# Patient Record
Sex: FEMALE | Race: WHITE | NOT HISPANIC OR LATINO | Employment: UNEMPLOYED | URBAN - METROPOLITAN AREA
[De-identification: names, ages, dates, MRNs, and addresses within clinical notes are randomized per-mention and may not be internally consistent; named-entity substitution may affect disease eponyms.]

---

## 2017-01-05 ENCOUNTER — APPOINTMENT (OUTPATIENT)
Dept: ADMISSIONS | Facility: MEDICAL CENTER | Age: 42
End: 2017-01-05
Attending: SPECIALIST
Payer: MEDICAID

## 2017-01-10 PROBLEM — N39.3 FEMALE STRESS INCONTINENCE: Status: ACTIVE | Noted: 2017-01-10

## 2017-05-13 ENCOUNTER — APPOINTMENT (OUTPATIENT)
Dept: RADIOLOGY | Facility: MEDICAL CENTER | Age: 42
End: 2017-05-13
Attending: EMERGENCY MEDICINE
Payer: MEDICAID

## 2017-05-13 ENCOUNTER — HOSPITAL ENCOUNTER (EMERGENCY)
Facility: MEDICAL CENTER | Age: 42
End: 2017-05-13
Attending: EMERGENCY MEDICINE
Payer: MEDICAID

## 2017-05-13 VITALS
HEIGHT: 64 IN | RESPIRATION RATE: 16 BRPM | DIASTOLIC BLOOD PRESSURE: 76 MMHG | SYSTOLIC BLOOD PRESSURE: 109 MMHG | BODY MASS INDEX: 32.44 KG/M2 | HEART RATE: 99 BPM | WEIGHT: 190 LBS

## 2017-05-13 DIAGNOSIS — S09.90XA CLOSED HEAD INJURY, INITIAL ENCOUNTER: ICD-10-CM

## 2017-05-13 DIAGNOSIS — S00.83XA FACIAL CONTUSION, INITIAL ENCOUNTER: ICD-10-CM

## 2017-05-13 DIAGNOSIS — R45.2 UNHAPPINESS: ICD-10-CM

## 2017-05-13 PROCEDURE — 96374 THER/PROPH/DIAG INJ IV PUSH: CPT

## 2017-05-13 PROCEDURE — 700111 HCHG RX REV CODE 636 W/ 250 OVERRIDE (IP): Performed by: EMERGENCY MEDICINE

## 2017-05-13 PROCEDURE — 71010 DX-CHEST-PORTABLE (1 VIEW): CPT

## 2017-05-13 PROCEDURE — 700102 HCHG RX REV CODE 250 W/ 637 OVERRIDE(OP): Performed by: EMERGENCY MEDICINE

## 2017-05-13 PROCEDURE — 99284 EMERGENCY DEPT VISIT MOD MDM: CPT

## 2017-05-13 PROCEDURE — 70486 CT MAXILLOFACIAL W/O DYE: CPT

## 2017-05-13 PROCEDURE — A9270 NON-COVERED ITEM OR SERVICE: HCPCS | Performed by: EMERGENCY MEDICINE

## 2017-05-13 PROCEDURE — 70450 CT HEAD/BRAIN W/O DYE: CPT

## 2017-05-13 RX ORDER — TRAMADOL HYDROCHLORIDE 50 MG/1
50 TABLET ORAL ONCE
Status: COMPLETED | OUTPATIENT
Start: 2017-05-13 | End: 2017-05-13

## 2017-05-13 RX ORDER — ONDANSETRON 4 MG/1
4 TABLET, ORALLY DISINTEGRATING ORAL ONCE
Status: COMPLETED | OUTPATIENT
Start: 2017-05-13 | End: 2017-05-13

## 2017-05-13 RX ADMIN — ONDANSETRON 4 MG: 4 TABLET, ORALLY DISINTEGRATING ORAL at 20:22

## 2017-05-13 RX ADMIN — HYDROMORPHONE HYDROCHLORIDE 1 MG: 1 INJECTION, SOLUTION INTRAMUSCULAR; INTRAVENOUS; SUBCUTANEOUS at 18:03

## 2017-05-13 RX ADMIN — TRAMADOL HYDROCHLORIDE 50 MG: 50 TABLET, COATED ORAL at 20:22

## 2017-05-13 ASSESSMENT — LIFESTYLE VARIABLES: DO YOU DRINK ALCOHOL: NO
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
## 2017-05-13 NOTE — ED AVS SNAPSHOT
PolySuite Access Code: [email or web address]  Expires: 6/12/2017  8:18 PM    Your email address is not on file at [facility].  Email Addresses are required for you to sign up for PolySuite, please contact [phone] to verify your personal information and to provide your email address prior to attempting to register for PolySuite.    Saira Wadsworthley  [address] [place] [address]    PolySuite  A secure, online tool to manage your health information     [facility]’s PolySuite® is a secure, online tool that connects you to your personalized health information from the privacy of your home -- day or night - making it very easy for you to manage your healthcare. Once the activation process is completed, you can even access your medical information using the PolySuite luba, which is available for free in the Apple Luba store or Google Play store.     To learn more about PolySuite, visit [email or web address]    There are two levels of access available (as shown below):   My Chart Features  [facility] Primary Care Doctor [facility]  Specialists [facility]  Urgent  Care Non-[facility] Primary Care Doctor   Email your healthcare team securely and privately 24/7 X X X    Manage appointments: schedule your next appointment; view details of past/upcoming appointments X      Request prescription refills. X      View recent personal medical records, including lab and immunizations X X X X   View health record, including health history, allergies, medications X X X X   Read reports about your outpatient visits, procedures, consult and ER notes X X X X   See your discharge summary, which is a recap of your hospital and/or ER visit that includes your diagnosis, lab results, and care plan X X  X     How to register for PolySuite:  Once your e-mail address has been verified, follow the following steps to sign up for PolySuite.     1. Go to  [email or web address]  2. Click on the Sign Up Now box, which takes you to the New Member Sign Up page. You will  need to provide the following information:  a. Enter your [email or web address] Access Code exactly as it appears at the top of this page. (You will not need to use this code after you’ve completed the sign-up process. If you do not sign up before the expiration date, you must request a new code.)   b. Enter your date of birth.   c. Enter your home email address.   d. Click Submit, and follow the next screen’s instructions.  3. Create a Power Plus Communicationst ID. This will be your [email or web address] login ID and cannot be changed, so think of one that is secure and easy to remember.  4. Create a [email or web address] password. You can change your password at any time.  5. Enter your Password Reset Question and Answer. This can be used at a later time if you forget your password.   6. Enter your e-mail address. This allows you to receive e-mail notifications when new information is available in [email or web address].  7. Click Sign Up. You can now view your health information.    For assistance activating your [email or web address] account, call [phone]

## 2017-05-13 NOTE — ED AVS SNAPSHOT
5/13/2017    Saira Groves  [address] [place] [address]    Dear Saira:    Critical access hospital wants to ensure your discharge home is safe and you or your loved ones have had all of your questions answered regarding your care after you leave the hospital.    Below is a list of resources and contact information should you have any questions regarding your hospital stay, follow-up instructions, or active medical symptoms.    Questions or Concerns Regarding… Contact   Medical Questions Related to Your Discharge  (7 days a week, 8am-5pm) Contact a Nurse Care Coordinator   [phone]   Medical Questions Not Related to Your Discharge  (24 hours a day / 7 days a week)  Contact the Nurse Health Line   [phone]    Medications or Discharge Instructions Refer to your discharge packet   or contact your [facility] Primary Care Provider   [phone]   Follow-up Appointment(s) Schedule your appointment via [employer]   or contact Scheduling [phone]   Billing Review your statement via [employer]  or contact Billing [phone]   Medical Records Review your records via [employer]   or contact Medical Records [phone]     You may receive a telephone call within two days of discharge. This call is to make certain you understand your discharge instructions and have the opportunity to have any questions answered. You can also easily access your medical information, test results and upcoming appointments via the [employer] free online health management tool. You can learn more and sign up at [employer]. For assistance setting up your [employer] account, please call [phone].    Once again, we want to ensure your discharge home is safe and that you have a clear understanding of any next steps in your care. If you have any questions or concerns, please do not hesitate to contact us, we are here for you. Thank you for choosing [facility] for your healthcare needs.    Sincerely,    Your [facility] Healthcare Team
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
## 2017-05-14 NOTE — DISCHARGE INSTRUCTIONS
Return immediately to the Emergency Department if you experience continuing or worsening headache, any numbness/weakness/tingling, fever or any other new or worsening symptoms.       Facial or Scalp Contusion  A facial or scalp contusion is a deep bruise on the face or head. Injuries to the face and head generally cause a lot of swelling, especially around the eyes. Contusions are the result of an injury that caused bleeding under the skin. The contusion may turn blue, purple, or yellow. Minor injuries will give you a painless contusion, but more severe contusions may stay painful and swollen for a few weeks.   CAUSES   A facial or scalp contusion is caused by a blunt injury or trauma to the face or head area.   SIGNS AND SYMPTOMS   · Swelling of the injured area.    · Discoloration of the injured area.    · Tenderness, soreness, or pain in the injured area.    DIAGNOSIS   The diagnosis can be made by taking a medical history and doing a physical exam. An X-ray exam, CT scan, or MRI may be needed to determine if there are any associated injuries, such as broken bones (fractures).  TREATMENT   Often, the best treatment for a facial or scalp contusion is applying cold compresses to the injured area. Over-the-counter medicines may also be recommended for pain control.   HOME CARE INSTRUCTIONS   · Only take over-the-counter or prescription medicines as directed by your health care provider.    · Apply ice to the injured area.    · Put ice in a plastic bag.    · Place a towel between your skin and the bag.    · Leave the ice on for 20 minutes, 2-3 times a day.    SEEK MEDICAL CARE IF:  · You have bite problems.    · You have pain with chewing.    · You are concerned about facial defects.  SEEK IMMEDIATE MEDICAL CARE IF:  · You have severe pain or a headache that is not relieved by medicine.    · You have unusual sleepiness, confusion, or personality changes.    · You throw up (vomit).    · You have a persistent  nosebleed.    · You have double vision or blurred vision.    · You have fluid drainage from your nose or ear.    · You have difficulty walking or using your arms or legs.    MAKE SURE YOU:   · Understand these instructions.  · Will watch your condition.  · Will get help right away if you are not doing well or get worse.     This information is not intended to replace advice given to you by your health care provider. Make sure you discuss any questions you have with your health care provider.     Document Released: 01/25/2006 Document Revised: 01/08/2016 Document Reviewed: 07/31/2014  [employer] Interactive Patient Education ©2016 [employer]        Head Injury, Adult  You have a head injury. Headaches and throwing up (vomiting) are common after a head injury. It should be easy to wake up from sleeping. Sometimes you must stay in the hospital. Most problems happen within the first 24 hours. Side effects may occur up to 7-10 days after the injury.   WHAT ARE THE TYPES OF HEAD INJURIES?  Head injuries can be as minor as a bump. Some head injuries can be more severe. More severe head injuries include:  · A jarring injury to the brain (concussion).  · A bruise of the brain (contusion). This mean there is bleeding in the brain that can cause swelling.  · A cracked skull (skull fracture).  · Bleeding in the brain that collects, clots, and forms a bump (hematoma).  WHEN SHOULD I GET HELP RIGHT AWAY?   · You are confused or sleepy.  · You cannot be woken up.  · You feel sick to your stomach (nauseous) or keep throwing up (vomiting).  · Your dizziness or unsteadiness is getting worse.  · You have very bad, lasting headaches that are not helped by medicine. Take medicines only as told by your doctor.  · You cannot use your arms or legs like normal.  · You cannot walk.  · You notice changes in the black spots in the center of the colored part of your eye (pupil).  · You have clear or bloody fluid coming from your nose or  ears.  · You have trouble seeing.  During the next 24 hours after the injury, you must stay with someone who can watch you. This person should get help right away (call 911 in the [place] if you start to shake and are not able to control it (have seizures), you pass out, or you are unable to wake up.  HOW CAN I PREVENT A HEAD INJURY IN THE FUTURE?  · Wear seat belts.  · Wear a helmet while bike riding and playing sports like football.  · Stay away from dangerous activities around the house.  WHEN CAN I RETURN TO NORMAL ACTIVITIES AND ATHLETICS?  See your doctor before doing these activities. You should not do normal activities or play contact sports until 1 week after the following symptoms have stopped:  · Headache that does not go away.  · Dizziness.  · Poor attention.  · Confusion.  · Memory problems.  · Sickness to your stomach or throwing up.  · Tiredness.  · Fussiness.  · Bothered by bright lights or loud noises.  · Anxiousness or depression.  · Restless sleep.  MAKE SURE YOU:   · Understand these instructions.  · Will watch your condition.  · Will get help right away if you are not doing well or get worse.     This information is not intended to replace advice given to you by your health care provider. Make sure you discuss any questions you have with your health care provider.     Document Released: 11/30/2009 Document Revised: 01/08/2016 Document Reviewed: 08/25/2014  [employer] Interactive Patient Education ©2016 [employer]

## 2017-10-25 ENCOUNTER — TELEPHONE (OUTPATIENT)
Dept: CARDIOLOGY | Facility: MEDICAL CENTER | Age: 42
End: 2017-10-25

## 2017-10-30 ENCOUNTER — OFFICE VISIT (OUTPATIENT)
Dept: CARDIOLOGY | Facility: MEDICAL CENTER | Age: 42
End: 2017-10-30
Payer: MEDICAID

## 2017-10-30 VITALS
DIASTOLIC BLOOD PRESSURE: 80 MMHG | HEIGHT: 64 IN | SYSTOLIC BLOOD PRESSURE: 140 MMHG | WEIGHT: 189 LBS | BODY MASS INDEX: 32.27 KG/M2 | HEART RATE: 80 BPM

## 2017-10-30 DIAGNOSIS — R07.89 OTHER CHEST PAIN: ICD-10-CM

## 2017-10-30 DIAGNOSIS — R94.31 NONSPECIFIC ABNORMAL ELECTROCARDIOGRAM (ECG) (EKG): ICD-10-CM

## 2017-10-30 LAB — EKG IMPRESSION: NORMAL

## 2017-10-30 PROCEDURE — 99244 OFF/OP CNSLTJ NEW/EST MOD 40: CPT | Performed by: INTERNAL MEDICINE

## 2017-10-30 PROCEDURE — 93000 ELECTROCARDIOGRAM COMPLETE: CPT | Performed by: INTERNAL MEDICINE

## 2017-10-30 ASSESSMENT — ENCOUNTER SYMPTOMS
COUGH: 0
ABDOMINAL PAIN: 0
PALPITATIONS: 0
SHORTNESS OF BREATH: 0
DIZZINESS: 0
WEIGHT LOSS: 0
HALLUCINATIONS: 0
BRUISES/BLEEDS EASILY: 0
SENSORY CHANGE: 0
ORTHOPNEA: 0
VOMITING: 0
DEPRESSION: 0
PND: 0
FEVER: 0
FALLS: 0
EYE PAIN: 0
CLAUDICATION: 0
CHILLS: 0
EYE DISCHARGE: 0
LOSS OF CONSCIOUSNESS: 0
MYALGIAS: 0
BLOOD IN STOOL: 0
SPEECH CHANGE: 0
DOUBLE VISION: 0
HEADACHES: 0
BLURRED VISION: 0
NAUSEA: 0

## 2017-10-30 NOTE — PROGRESS NOTES
"Subjective:   Patient is 43 yo woman who is here for cardiac care and evaluation for chest pain. Patient has chest pain at sporadic times. No specific precipitating factors or worsening factors. Chest pain is described to be pressure-like sensation however localized at anterior chest without radition. Lasting for seconds to minutes. No prior cardiac problems. No prior cardiac workup.    I have reviewed patient's ECG, which shows normal sinus rhythm, normal DC, QT intervals. No evidence of acute coronary syndrome.    Past Medical History:   Diagnosis Date   • ADD (attention deficit disorder with hyperactivity)    • Anesthesia     \"Woke up with itchy rash, anxious, low Bp & elevated HRx1 episode\"   • Anxiety    • Anxiety and depression    • ASTHMA     Has Albuterol prn   • Cancer (CMS-[facility]) 2016    \"Skin Cancer\"   • ENDOMETRIOSIS    • IBS (irritable bowel syndrome)    • Lactose intolerance    • OCD (obsessive compulsive disorder)    • Osteoarthritis 1-5-17    \"Bilateral Shoulders\"   • Pain 1-5-17    \"Endometrosis & arthritis pain\"   • Urinary incontinence      Past Surgical History:   Procedure Laterality Date   • ANTERIOR AND POSTERIOR REPAIR  1/10/2017    Procedure: ANTERIOR AND POSTERIOR REPAIR;  Surgeon: Bunny Martin M.D.;  Location: SURGERY SAME DAY [facility] OR[facility];  Service:    • ENTEROCELE REPAIR  1/10/2017    Procedure: ENTEROCELE REPAIR;  Surgeon: Bunny Martin M.D.;  Location: SURGERY SAME DAY [facility] OR[facility];  Service:    • VAGINAL SUSPENSION  1/10/2017    Procedure: VAGINAL SUSPENSION - SACROSPINOUS VAULT, PERINEOPLASTY;  Surgeon: Bunny Martin M.D.;  Location: SURGERY SAME DAY [facility];  Service:    • BLADDER SLING FEMALE  1/10/2017    Procedure: BLADDER SLING FEMALE -  FOR : TOT;  Surgeon: Bunny Martin M.D.;  Location: SURGERY SAME DAY [facility] OR[facility];  Service:    • GYN SURGERY  2015    \"One ovary removed\"   • OTHER  2014    Hysterectomy   • CHOLECYSTECTOMY  2008   • EXPLORATORY " "LAPAROTOMY  2008   • GYN SURGERY      \"scraping\" due to endometriosis   • SPHINCTEROTOMY       Family History   Problem Relation Age of Onset   • Alcohol/Drug Mother    • Arthritis Father    • Cancer Father      berast   • Cancer Brother      leukemia   • Alcohol/Drug Maternal Uncle    • Cancer Paternal Grandmother      skin   • Cancer Paternal Grandfather      skin   • Alcohol/Drug Brother    • Lung Disease Neg Hx    • Diabetes Neg Hx    • Heart Disease Neg Hx    • Stroke Neg Hx      History   Smoking Status   • Never Smoker   Smokeless Tobacco   • Never Used     Allergies   Allergen Reactions   • Formaldehyde      \"Itchy Throat & Red Eyes\"   • Milk [Lac Bovis]      Lactose Intolerant   • Other Misc      Cigarette Smoke- Reaction wheezing   • Red Dye      \"Itchy Throat & Red Eyes\"     Outpatient Encounter Prescriptions as of 10/30/2017   Medication Sig Dispense Refill   • amitriptyline (ELAVIL) 10 MG Tab Take 10 mg by mouth every evening.     • prazosin (MINIPRESS) 1 MG Cap Take 1 mg by mouth every evening.     • busPIRone (BUSPAR) 10 MG Tab Take 10 mg by mouth 2 times a day.     • baclofen (LIORESAL) 10 MG Tab Take 10 mg by mouth as needed.     • methylphenidate (RITALIN) 20 MG tablet Take 20 mg by mouth 2 times a day.     • fluoxetine (PROZAC) 20 MG CAPS Take 40 mg by mouth every day.     • cyclobenzaprine (FLEXERIL) 10 MG TABS 5-10 mg nightly prn 30 Each 3   • fluticasone (FLONASE) 50 MCG/ACT nasal spray Spray 2 Sprays in nose every day. Each Nostril 1 Bottle 3   • naproxen (NAPROSYN) 500 MG TABS Take 1 Tab by mouth 2 times daily with meals as needed. Prn pain (anti inflammatory) 60 Tab 3   • albuterol (VENTOLIN OR PROVENTIL) 108 (90 BASE) MCG/ACT AERS Inhale 2 Puffs by mouth every 6 hours as needed for Shortness of Breath. 1 Inhaler 3   • loratadine (CLARITIN) 10 MG TABS Take 1 Tab by mouth every day. 30 Tab 3   • trazodone (DESYREL) 50 MG TABS Take 50 mg by mouth every evening. PRN       No " "facility-administered encounter medications on file as of 10/30/2017.      Review of Systems   Constitutional: Negative for chills, fever, malaise/fatigue and weight loss.   HENT: Negative for ear discharge, ear pain, hearing loss and nosebleeds.    Eyes: Negative for blurred vision, double vision, pain and discharge.   Respiratory: Negative for cough and shortness of breath.    Cardiovascular: Positive for chest pain. Negative for palpitations, orthopnea, claudication, leg swelling and PND.   Gastrointestinal: Negative for abdominal pain, blood in stool, melena, nausea and vomiting.   Genitourinary: Negative for dysuria and hematuria.   Musculoskeletal: Negative for falls, joint pain and myalgias.   Skin: Negative for itching and rash.   Neurological: Negative for dizziness, sensory change, speech change, loss of consciousness and headaches.   Endo/Heme/Allergies: Negative for environmental allergies. Does not bruise/bleed easily.   Psychiatric/Behavioral: Negative for depression, hallucinations and suicidal ideas.        Objective:   /80   Pulse 80   Ht 1.626 m (5' 4\")   Wt 85.7 kg (189 lb)   LMP 12/01/2014   BMI 32.44 kg/m²     Physical Exam   Constitutional: She is oriented to person, place, and time. She appears well-developed and well-nourished.   HENT:   Head: Normocephalic and atraumatic.   Eyes: EOM are normal.   Neck: No JVD present.   Cardiovascular: Normal rate, regular rhythm, normal heart sounds and intact distal pulses.  Exam reveals no gallop and no friction rub.    No murmur heard.  Pulmonary/Chest: No respiratory distress. She has no wheezes. She has no rales. She exhibits no tenderness.   Abdominal: She exhibits no distension. There is no tenderness. There is no rebound and no guarding.   Musculoskeletal: She exhibits no edema or tenderness.   Lymphadenopathy:     She has no cervical adenopathy.   Neurological: She is alert and oriented to person, place, and time.   Skin: Skin is dry. "   Psychiatric: She has a normal mood and affect.   Nursing note and vitals reviewed.      Assessment:     1. Nonspecific abnormal electrocardiogram (ECG) (EKG)  EKG    ECHOCARDIOGRAM COMP W/O CONT    TREADMILL STRESS   2. Other chest pain  ECHOCARDIOGRAM COMP W/O CONT    TREADMILL STRESS       Medical Decision Making:  Today's Assessment / Status / Plan:   At this time, to further risk stratify, I will order a transthoracic echocardiogram to assess cardiac and valvular functions. I will also order a treadmill stress test (to avoid radiation exposure in young patients) to assess for coronary ischemia.

## 2017-11-15 ENCOUNTER — HOSPITAL ENCOUNTER (OUTPATIENT)
Dept: CARDIOLOGY | Facility: MEDICAL CENTER | Age: 42
End: 2017-11-15
Attending: INTERNAL MEDICINE
Payer: MEDICAID

## 2017-11-15 ENCOUNTER — NON-PROVIDER VISIT (OUTPATIENT)
Dept: CARDIOLOGY | Facility: MEDICAL CENTER | Age: 42
End: 2017-11-15
Payer: MEDICAID

## 2017-11-15 VITALS
DIASTOLIC BLOOD PRESSURE: 76 MMHG | HEART RATE: 98 BPM | WEIGHT: 189 LBS | BODY MASS INDEX: 32.27 KG/M2 | OXYGEN SATURATION: 98 % | HEIGHT: 64 IN | SYSTOLIC BLOOD PRESSURE: 122 MMHG

## 2017-11-15 DIAGNOSIS — R94.31 NONSPECIFIC ABNORMAL ELECTROCARDIOGRAM (ECG) (EKG): ICD-10-CM

## 2017-11-15 DIAGNOSIS — R07.89 OTHER CHEST PAIN: ICD-10-CM

## 2017-11-15 LAB
LV EJECT FRACT  99904: 65
LV EJECT FRACT MOD 2C 99903: 75.14
LV EJECT FRACT MOD 4C 99902: 71.64
LV EJECT FRACT MOD BP 99901: 73.53
TREADMILL STRESS: NORMAL

## 2017-11-15 PROCEDURE — 93306 TTE W/DOPPLER COMPLETE: CPT | Mod: 26 | Performed by: INTERNAL MEDICINE

## 2017-11-15 PROCEDURE — 93015 CV STRESS TEST SUPVJ I&R: CPT | Performed by: INTERNAL MEDICINE

## 2017-11-15 PROCEDURE — 93306 TTE W/DOPPLER COMPLETE: CPT

## 2017-11-16 NOTE — PROGRESS NOTES
Dear Nicole,    Can you please let Saira Groves know that result is ok and I will see patient as scheduled?    Thanks Chuckie Rivera.

## 2017-11-21 ENCOUNTER — TELEPHONE (OUTPATIENT)
Dept: CARDIOLOGY | Facility: MEDICAL CENTER | Age: 42
End: 2017-11-21

## 2017-11-21 NOTE — TELEPHONE ENCOUNTER
Message   Received: 4 days ago   Message Contents   OZIEL Garcia R.N.             Dear Nicole,     Can you please let Saira Groves know that result is ok and I will see patient as scheduled?     Thanks Chuckie Rivera.      Letter sent.

## 2017-11-22 ENCOUNTER — TELEPHONE (OUTPATIENT)
Dept: CARDIOLOGY | Facility: MEDICAL CENTER | Age: 42
End: 2017-11-22

## 2017-11-22 NOTE — LETTER
November 22, 2017        Saira Groves  [id number] [place] [id number]          Dear Saira,    We have received the results of your recent Echocardiogram on 11/15.    Your test came back unchanged or within normal limits.      Please follow up as previously discussed with your physician.      Appt with  To January 2018  (not yet scheduled).    Feel free to call us with any questions.        Sincerely,        [facility]  Electronically Signed

## 2017-11-23 NOTE — TELEPHONE ENCOUNTER
ECHOCARDIOGRAM COMP W/O CONT   Order: [id number]   Status:  Final result   Visible to patient:  No (Inaccessible in MyChart) Dx:  Other chest pain; Nonspecific abnorma...   Notes Recorded by Kady Suárez M.D. on 11/15/2017 at 5:11 PM PST  Dear Nicole,    Can you please let Saira Groves know that result is ok and I will see patient as scheduled?    Thanks Chuckie Rivera.     Letter sent.

## 2020-02-21 ENCOUNTER — APPOINTMENT (OUTPATIENT)
Dept: RADIOLOGY | Facility: MEDICAL CENTER | Age: 45
End: 2020-02-21
Attending: EMERGENCY MEDICINE
Payer: MEDICAID

## 2020-02-21 ENCOUNTER — HOSPITAL ENCOUNTER (EMERGENCY)
Facility: MEDICAL CENTER | Age: 45
End: 2020-02-21
Attending: EMERGENCY MEDICINE
Payer: MEDICAID

## 2020-02-21 VITALS
DIASTOLIC BLOOD PRESSURE: 76 MMHG | TEMPERATURE: 99 F | HEART RATE: 88 BPM | RESPIRATION RATE: 16 BRPM | SYSTOLIC BLOOD PRESSURE: 125 MMHG | OXYGEN SATURATION: 99 % | WEIGHT: 153.44 LBS | BODY MASS INDEX: 26.2 KG/M2 | HEIGHT: 64 IN

## 2020-02-21 DIAGNOSIS — J20.9 ACUTE BRONCHITIS, UNSPECIFIED ORGANISM: ICD-10-CM

## 2020-02-21 LAB
ANION GAP SERPL CALC-SCNC: 17 MMOL/L (ref 7–16)
BASOPHILS # BLD AUTO: 0.2 % (ref 0–1.8)
BASOPHILS # BLD: 0.01 K/UL (ref 0–0.12)
BUN SERPL-MCNC: 11 MG/DL (ref 8–22)
CALCIUM SERPL-MCNC: 9.9 MG/DL (ref 8.4–10.2)
CHLORIDE SERPL-SCNC: 101 MMOL/L (ref 96–112)
CO2 SERPL-SCNC: 22 MMOL/L (ref 20–33)
CREAT SERPL-MCNC: 1.06 MG/DL (ref 0.5–1.4)
D DIMER PPP IA.FEU-MCNC: <0.4 UG/ML (FEU) (ref 0–0.5)
EKG IMPRESSION: NORMAL
EOSINOPHIL # BLD AUTO: 0.01 K/UL (ref 0–0.51)
EOSINOPHIL NFR BLD: 0.2 % (ref 0–6.9)
ERYTHROCYTE [DISTWIDTH] IN BLOOD BY AUTOMATED COUNT: 42.6 FL (ref 35.9–50)
GLUCOSE SERPL-MCNC: 109 MG/DL (ref 65–99)
HCT VFR BLD AUTO: 39.7 % (ref 37–47)
HGB BLD-MCNC: 13.2 G/DL (ref 12–16)
IMM GRANULOCYTES # BLD AUTO: 0.02 K/UL (ref 0–0.11)
IMM GRANULOCYTES NFR BLD AUTO: 0.4 % (ref 0–0.9)
LYMPHOCYTES # BLD AUTO: 1.93 K/UL (ref 1–4.8)
LYMPHOCYTES NFR BLD: 36.3 % (ref 22–41)
MCH RBC QN AUTO: 30.6 PG (ref 27–33)
MCHC RBC AUTO-ENTMCNC: 33.2 G/DL (ref 33.6–35)
MCV RBC AUTO: 91.9 FL (ref 81.4–97.8)
MONOCYTES # BLD AUTO: 0.32 K/UL (ref 0–0.85)
MONOCYTES NFR BLD AUTO: 6 % (ref 0–13.4)
NEUTROPHILS # BLD AUTO: 3.02 K/UL (ref 2–7.15)
NEUTROPHILS NFR BLD: 56.9 % (ref 44–72)
NRBC # BLD AUTO: 0 K/UL
NRBC BLD-RTO: 0 /100 WBC
PLATELET # BLD AUTO: 382 K/UL (ref 164–446)
PMV BLD AUTO: 9.1 FL (ref 9–12.9)
POTASSIUM SERPL-SCNC: 3.6 MMOL/L (ref 3.6–5.5)
RBC # BLD AUTO: 4.32 M/UL (ref 4.2–5.4)
SODIUM SERPL-SCNC: 140 MMOL/L (ref 135–145)
TROPONIN T SERPL-MCNC: <6 NG/L (ref 6–19)
WBC # BLD AUTO: 5.3 K/UL (ref 4.8–10.8)

## 2020-02-21 PROCEDURE — 700111 HCHG RX REV CODE 636 W/ 250 OVERRIDE (IP): Performed by: EMERGENCY MEDICINE

## 2020-02-21 PROCEDURE — 700101 HCHG RX REV CODE 250: Performed by: EMERGENCY MEDICINE

## 2020-02-21 PROCEDURE — 700102 HCHG RX REV CODE 250 W/ 637 OVERRIDE(OP): Performed by: EMERGENCY MEDICINE

## 2020-02-21 PROCEDURE — 84484 ASSAY OF TROPONIN QUANT: CPT

## 2020-02-21 PROCEDURE — A9270 NON-COVERED ITEM OR SERVICE: HCPCS | Performed by: EMERGENCY MEDICINE

## 2020-02-21 PROCEDURE — 80048 BASIC METABOLIC PNL TOTAL CA: CPT

## 2020-02-21 PROCEDURE — 71046 X-RAY EXAM CHEST 2 VIEWS: CPT

## 2020-02-21 PROCEDURE — 94640 AIRWAY INHALATION TREATMENT: CPT

## 2020-02-21 PROCEDURE — 93005 ELECTROCARDIOGRAM TRACING: CPT

## 2020-02-21 PROCEDURE — 93005 ELECTROCARDIOGRAM TRACING: CPT | Performed by: EMERGENCY MEDICINE

## 2020-02-21 PROCEDURE — 85025 COMPLETE CBC W/AUTO DIFF WBC: CPT

## 2020-02-21 PROCEDURE — 85379 FIBRIN DEGRADATION QUANT: CPT

## 2020-02-21 PROCEDURE — 94760 N-INVAS EAR/PLS OXIMETRY 1: CPT

## 2020-02-21 PROCEDURE — 99284 EMERGENCY DEPT VISIT MOD MDM: CPT

## 2020-02-21 RX ORDER — ALBUTEROL SULFATE 90 UG/1
2 AEROSOL, METERED RESPIRATORY (INHALATION) EVERY 6 HOURS PRN
Qty: 8.5 G | Refills: 0 | Status: SHIPPED | OUTPATIENT
Start: 2020-02-21

## 2020-02-21 RX ORDER — BENZONATATE 100 MG/1
100 CAPSULE ORAL 3 TIMES DAILY PRN
Qty: 15 CAP | Refills: 0 | Status: SHIPPED | OUTPATIENT
Start: 2020-02-21

## 2020-02-21 RX ORDER — LORAZEPAM 1 MG/1
0.5 TABLET ORAL ONCE
Status: COMPLETED | OUTPATIENT
Start: 2020-02-21 | End: 2020-02-21

## 2020-02-21 RX ORDER — PREDNISONE 20 MG/1
60 TABLET ORAL DAILY
Qty: 15 TAB | Refills: 0 | Status: SHIPPED | OUTPATIENT
Start: 2020-02-21 | End: 2020-02-26

## 2020-02-21 RX ORDER — IPRATROPIUM BROMIDE AND ALBUTEROL SULFATE 2.5; .5 MG/3ML; MG/3ML
3 SOLUTION RESPIRATORY (INHALATION)
Status: DISCONTINUED | OUTPATIENT
Start: 2020-02-21 | End: 2020-02-21 | Stop reason: HOSPADM

## 2020-02-21 RX ADMIN — IPRATROPIUM BROMIDE AND ALBUTEROL SULFATE 3 ML: .5; 3 SOLUTION RESPIRATORY (INHALATION) at 17:52

## 2020-02-21 RX ADMIN — LORAZEPAM 0.5 MG: 1 TABLET ORAL at 17:59

## 2020-02-21 RX ADMIN — PREDNISONE 60 MG: 10 TABLET ORAL at 17:58

## 2020-02-22 NOTE — ED TRIAGE NOTES
Pt comes in  Was sick last week w/ cough and congestion   Started using her inhaler   Notices yesterday w/ increased SOB all day and increased inhaler use yesterday  Today continues w/ SOB and now does not feel inhaler is working  continues w/ SOB   Speaking in full sentences w/out difficulty

## 2020-02-22 NOTE — ED PROVIDER NOTES
ED Provider Note    CHIEF COMPLAINT  Chief Complaint   Patient presents with   • Shortness of Breath     started yesterday  neededing to use her inhaler with little effect        HPI  Saira Groves is a 44 y.o. female with a history of anxiety and depression, asthma, IBS, and ADHD who presents with a chief complaint of shortness of breath.  Patient notes that she had cough and cold symptoms for the past 10 days that responded to Mucinex.  She endorses associated cough productive of yellow and brown sputum, chest tightness, nasal congestion, and sore throat.  The majority of the symptoms resolved several days ago but 2 days ago she developed shortness of breath that has worsened despite using her albuterol inhaler.  She notes associated anxiety and reports that she does have a history of the same.  She has spoken with her psychiatrist who has not given her any changes in her medications.  She is moving next week and this ultimately prompted her presentation to the ER as she was concerned she would have a panic attack in route.  She denies any history of DVT/PE, lower extremity edema, recent surgeries, exogenous hormone use, or recent long distance travel.  She denies any fevers or chest pain.    REVIEW OF SYSTEMS  See HPI for further details.  Chest tightness.  Shortness of breath.  Anxiety.  All other systems are negative.     PAST MEDICAL HISTORY   has a past medical history of ADD (attention deficit disorder with hyperactivity), Anesthesia (), Anxiety, Anxiety and depression, ASTHMA, Cancer (HCC) (2016), ENDOMETRIOSIS, IBS (irritable bowel syndrome), Lactose intolerance, OCD (obsessive compulsive disorder), Osteoarthritis (1-5-17), Pain (1-5-17), and Urinary incontinence.    SOCIAL HISTORY  Social History     Tobacco Use   • Smoking status: Never Smoker   • Smokeless tobacco: Never Used   Substance and Sexual Activity   • Alcohol use: No   • Drug use: No   • Sexual activity: Yes     Partners: Male     " Comment: SO       SURGICAL HISTORY   has a past surgical history that includes other (2014); gyn surgery; gyn surgery (2015); cholecystectomy (2008); sphincterotomy; exploratory laparotomy (2008); anterior and posterior repair (1/10/2017); enterocele repair (1/10/2017); vaginal suspension (1/10/2017); and bladder sling female (1/10/2017).    CURRENT MEDICATIONS  Home Medications     Reviewed by Sofia Gomez R.N. (Registered Nurse) on 02/21/20 at 1631  Med List Status: Partial   Medication Last Dose Status   albuterol (VENTOLIN OR PROVENTIL) 108 (90 BASE) MCG/ACT AERS 2/21/2020 Active   amitriptyline (ELAVIL) 10 MG Tab 2/20/2020 Active   baclofen (LIORESAL) 10 MG Tab 2/21/2020 Active   busPIRone (BUSPAR) 10 MG Tab 2/21/2020 Active   cyclobenzaprine (FLEXERIL) 10 MG TABS prn Active   fluoxetine (PROZAC) 20 MG CAPS 2/21/2020 Active   fluticasone (FLONASE) 50 MCG/ACT nasal spray 2/21/2020 Active   loratadine (CLARITIN) 10 MG TABS 2/21/2020 Active   methylphenidate (RITALIN) 20 MG tablet 2/21/2020 Active   naproxen (NAPROSYN) 500 MG TABS prn Active   prazosin (MINIPRESS) 1 MG Cap 2/20/2020 Active   trazodone (DESYREL) 50 MG TABS prn Active                ALLERGIES  Allergies   Allergen Reactions   • Formaldehyde      \"Itchy Throat & Red Eyes\"   • Milk [Lac Bovis]      Lactose Intolerant   • Other Misc      Cigarette Smoke- Reaction wheezing   • Red Dye      \"Itchy Throat & Red Eyes\"       PHYSICAL EXAM  VITAL SIGNS: /75   Pulse 90   Temp 37.2 °C (99 °F) (Temporal)   Resp 16   Ht 1.626 m (5' 4\")   Wt 69.6 kg (153 lb 7 oz)   LMP 12/01/2014   SpO2 98%   BMI 26.34 kg/m²    Pulse ox interpretation: I interpret this pulse ox as normal.  Constitutional: Alert in no apparent distress.  HENT: No signs of trauma, Bilateral external ears normal, Nose normal.  Moist mucous membranes.  Posterior oropharynx is moist and pink without tonsillar erythema, edema, or exudates.  Eyes: Pupils are equal and reactive, " Conjunctiva normal, Non-icteric.   Neck: Normal range of motion, No tenderness, Supple, No stridor.   Lymphatic: No lymphadenopathy noted.   Cardiovascular: Regular rate and rhythm, no murmurs.   Thorax & Lungs: Normal breath sounds, No respiratory distress, No wheezing, No chest tenderness.   Abdomen: Bowel sounds normal, Soft, No tenderness, No masses, No pulsatile masses. No peritoneal signs.  Skin: Warm, Dry, No erythema, No rash.   Back: Normal alignment.  Extremities: Intact distal pulses, No edema, No tenderness, No cyanosis.  Musculoskeletal: Good range of motion in all major joints. No tenderness to palpation or major deformities noted.   Neurologic: Alert, Normal motor function, Normal sensory function, No focal deficits noted.   Psychiatric: Affect normal, Judgment normal, Mood normal.     DIAGNOSTIC STUDIES / PROCEDURES    EKG  Results for orders placed or performed during the hospital encounter of 20   EKG   Result Value Ref Range    Report       [facility] Emergency Dept.    Test Date:  2020  Pt Name:    BIA MONTAGUE             Department: [facility]  MRN:        [id number]                      [facility]  Gender:     Female                       Technician: CRESENCIO  :        1975                   Requested By:ER TRIAGE PROTOCOL  Order #:    [id number]                    Reading MD: Tate Mcgowan MD    Measurements  Intervals                                Axis  Rate:       90                           P:          50  TN:         125                          QRS:        49  QRSD:       91                           T:          57  QT:         363  QTc:        444    Interpretive Statements  Sinus rhythm  Probable left atrial enlargement  Anteroseptal infarct, age indeterminate  Compared to ECG 10/30/2017 10:58:04  Myocardial infarct finding now present  Electronically Signed On 2020 19:00:28 PST by Tate Mcgowan MD        LABS  CBC  BMP  Troponin  D-dimer    RADIOLOGY  Chest x-ray    COURSE & MEDICAL DECISION MAKING  Pertinent Labs & Imaging studies reviewed. (See chart for details)  This is a 44-year-old female who is here with chest tightness and cough consistent with prior asthma exacerbations.  She notes a history of anxiety as well and is currently feeling quite anxious due to her upcoming move and persistent symptoms.  Her physical exam is reassuring.  No lower extremity edema.  The patient ruled out for PE by using the PERC criteria.  I did elect to check a d-dimer for further risk stratification and this was negative.  She was given a dose of prednisone as well as a DuoNeb.  I elected to give her dose of Ativan which she has requested for anxiety and this helped with her overall symptoms.    No leukocytosis.  Her anion gap is slightly elevated to 17 and a blood glucose was noted to be elevated to 109.  Troponin and d-dimer again were both normal.  Chest x-ray was normal.    Very low suspicion for ACS without any chest pain or risk factors.  EKG did reveal T wave inversions in V1 and V2 but this is consistent with prior.  Clinical history is most consistent with an asthma exacerbation.  She does feel improved following her interventions here.  She is safe for discharge with close outpatient management.  I have refilled her albuterol inhaler prescription and she was given a prescription for prednisone and Tessalon Perles.  I have instructed her not to drink alcohol or drive after taking the Tessalon Perles as they may make her sleepy.  I have asked her to follow-up with her primary care physician on Monday for a recheck.    The patient will not drink alcohol nor drive with prescribed medications. The patient will return for worsening symptoms and is stable at the time of discharge. The patient verbalizes understanding and will comply.    FINAL IMPRESSION  1.  Acute bronchitis    Electronically signed by: Tate Mcgowan,  M.D., 2/21/2020 5:06 PM

## 2020-02-22 NOTE — DISCHARGE INSTRUCTIONS
You were seen in the ER for cough and cold symptoms that are likely due to bronchitis.  Bronchitis is a virus and does not respond to antibiotics.  I have given you a prescription for prednisone which will help with the inflammation, it is a steroid, please take it as directed.  I have also refilled your albuterol prescription as well as given you a prescription for Tessalon which is cough medication.  The Tessalon Perls are not a narcotic but they may make you sleepy so do not drive or drink alcohol after taking this medication.  Follow-up with your primary care physician within 48 hours for a recheck and return immediately to the ER with new or worsening symptoms.  Remember that you cannot drive yourself home as you received a dose of Ativan which is an anxiety medication.

## 2020-02-22 NOTE — FLOWSHEET NOTE
02/21/20 1753   Events/Summary/Plan   Events/Summary/Plan SVN   Vital Signs   Pulse 98   Respiration 18   Pulse Oximetry 99 %   $ Pulse Oximetry (Spot Check) Yes   Respiratory Assessment   Level of Consciousness Alert   Breath Sounds   RUL Breath Sounds Clear   RML Breath Sounds Clear   RLL Breath Sounds Diminished   PEPE Breath Sounds Clear   LLL Breath Sounds Diminished   Oxygen   O2 (LPM) 0   O2 Delivery Device Silicone Nasal Cannula

## 2021-11-29 NOTE — LETTER
"     [facility]   [address], [address]  [place] [address]  Phone: [phone]  Fax: [phone]              Saira Groves  1975    Encounter Date: 10/30/2017    Kady Suárez M.D.          PROGRESS NOTE:  Subjective:   Patient is 43 yo woman who is here for cardiac care and evaluation for chest pain. Patient has chest pain at sporadic times. No specific precipitating factors or worsening factors. Chest pain is described to be pressure-like sensation however localized at anterior chest without radition. Lasting for seconds to minutes. No prior cardiac problems. No prior cardiac workup.    I have reviewed patient's ECG, which shows normal sinus rhythm, normal WV, QT intervals. No evidence of acute coronary syndrome.    Past Medical History:   Diagnosis Date   • ADD (attention deficit disorder with hyperactivity)    • Anesthesia     \"Woke up with itchy rash, anxious, low Bp & elevated HRx1 episode\"   • Anxiety    • Anxiety and depression    • ASTHMA     Has Albuterol prn   • Cancer ([facility]) 2016    \"Skin Cancer\"   • ENDOMETRIOSIS    • IBS (irritable bowel syndrome)    • Lactose intolerance    • OCD (obsessive compulsive disorder)    • Osteoarthritis 1-5-17    \"Bilateral Shoulders\"   • Pain 1-5-17    \"Endometrosis & arthritis pain\"   • Urinary incontinence      Past Surgical History:   Procedure Laterality Date   • ANTERIOR AND POSTERIOR REPAIR  1/10/2017    Procedure: ANTERIOR AND POSTERIOR REPAIR;  Surgeon: Bunny Martin M.D.;  Location: SURGERY SAME DAY [facility] ORS;  Service:    • ENTEROCELE REPAIR  1/10/2017    Procedure: ENTEROCELE REPAIR;  Surgeon: Bunny Martin M.D.;  Location: SURGERY SAME DAY [facility] ORS;  Service:    • VAGINAL SUSPENSION  1/10/2017    Procedure: VAGINAL SUSPENSION - SACROSPINOUS VAULT, PERINEOPLASTY;  Surgeon: Bunny Martin M.D.;  Location: SURGERY SAME DAY [facility] ORS;  Service:    • BLADDER SLING FEMALE  " "1/10/2017    Procedure: BLADDER SLING FEMALE -  FOR : TOT;  Surgeon: Bunny Martin M.D.;  Location: SURGERY SAME DAY [facility];  Service:    • GYN SURGERY  2015    \"One ovary removed\"   • OTHER  2014    Hysterectomy   • CHOLECYSTECTOMY  2008   • EXPLORATORY LAPAROTOMY  2008   • GYN SURGERY      \"scraping\" due to endometriosis   • SPHINCTEROTOMY       Family History   Problem Relation Age of Onset   • Alcohol/Drug Mother    • Arthritis Father    • Cancer Father      berast   • Cancer Brother      leukemia   • Alcohol/Drug Maternal Uncle    • Cancer Paternal Grandmother      skin   • Cancer Paternal Grandfather      skin   • Alcohol/Drug Brother    • Lung Disease Neg Hx    • Diabetes Neg Hx    • Heart Disease Neg Hx    • Stroke Neg Hx      History   Smoking Status   • Never Smoker   Smokeless Tobacco   • Never Used     Allergies   Allergen Reactions   • Formaldehyde      \"Itchy Throat & Red Eyes\"   • Milk [Lac Bovis]      Lactose Intolerant   • Other Misc      Cigarette Smoke- Reaction wheezing   • Red Dye      \"Itchy Throat & Red Eyes\"     Outpatient Encounter Prescriptions as of 10/30/2017   Medication Sig Dispense Refill   • amitriptyline (ELAVIL) 10 MG Tab Take 10 mg by mouth every evening.     • prazosin (MINIPRESS) 1 MG Cap Take 1 mg by mouth every evening.     • busPIRone (BUSPAR) 10 MG Tab Take 10 mg by mouth 2 times a day.     • baclofen (LIORESAL) 10 MG Tab Take 10 mg by mouth as needed.     • methylphenidate (RITALIN) 20 MG tablet Take 20 mg by mouth 2 times a day.     • fluoxetine (PROZAC) 20 MG CAPS Take 40 mg by mouth every day.     • cyclobenzaprine (FLEXERIL) 10 MG TABS 5-10 mg nightly prn 30 Each 3   • fluticasone (FLONASE) 50 MCG/ACT nasal spray Spray 2 Sprays in nose every day. Each Nostril 1 Bottle 3   • naproxen (NAPROSYN) 500 MG TABS Take 1 Tab by mouth 2 times daily with meals as needed. Prn pain (anti inflammatory) 60 Tab 3   • albuterol (VENTOLIN OR PROVENTIL) 108 (90 BASE) MCG/ACT AERS " "Inhale 2 Puffs by mouth every 6 hours as needed for Shortness of Breath. 1 Inhaler 3   • loratadine (CLARITIN) 10 MG TABS Take 1 Tab by mouth every day. 30 Tab 3   • trazodone (DESYREL) 50 MG TABS Take 50 mg by mouth every evening. PRN       No facility-administered encounter medications on file as of 10/30/2017.      Review of Systems   Constitutional: Negative for chills, fever, malaise/fatigue and weight loss.   HENT: Negative for ear discharge, ear pain, hearing loss and nosebleeds.    Eyes: Negative for blurred vision, double vision, pain and discharge.   Respiratory: Negative for cough and shortness of breath.    Cardiovascular: Positive for chest pain. Negative for palpitations, orthopnea, claudication, leg swelling and PND.   Gastrointestinal: Negative for abdominal pain, blood in stool, melena, nausea and vomiting.   Genitourinary: Negative for dysuria and hematuria.   Musculoskeletal: Negative for falls, joint pain and myalgias.   Skin: Negative for itching and rash.   Neurological: Negative for dizziness, sensory change, speech change, loss of consciousness and headaches.   Endo/Heme/Allergies: Negative for environmental allergies. Does not bruise/bleed easily.   Psychiatric/Behavioral: Negative for depression, hallucinations and suicidal ideas.        Objective:   /80   Pulse 80   Ht 1.626 m (5' 4\")   Wt 85.7 kg (189 lb)   LMP 12/01/2014   BMI 32.44 kg/m²      Physical Exam   Constitutional: She is oriented to person, place, and time. She appears well-developed and well-nourished.   HENT:   Head: Normocephalic and atraumatic.   Eyes: EOM are normal.   Neck: No JVD present.   Cardiovascular: Normal rate, regular rhythm, normal heart sounds and intact distal pulses.  Exam reveals no gallop and no friction rub.    No murmur heard.  Pulmonary/Chest: No respiratory distress. She has no wheezes. She has no rales. She exhibits no tenderness.   Abdominal: She exhibits no distension. There is no " tenderness. There is no rebound and no guarding.   Musculoskeletal: She exhibits no edema or tenderness.   Lymphadenopathy:     She has no cervical adenopathy.   Neurological: She is alert and oriented to person, place, and time.   Skin: Skin is dry.   Psychiatric: She has a normal mood and affect.   Nursing note and vitals reviewed.      Assessment:     1. Nonspecific abnormal electrocardiogram (ECG) (EKG)  EKG    ECHOCARDIOGRAM COMP W/O CONT    TREADMILL STRESS   2. Other chest pain  ECHOCARDIOGRAM COMP W/O CONT    TREADMILL STRESS       Medical Decision Making:  Today's Assessment / Status / Plan:   At this time, to further risk stratify, I will order a transthoracic echocardiogram to assess cardiac and valvular functions. I will also order a treadmill stress test (to avoid radiation exposure in young patients) to assess for coronary ischemia.        FARTUN CarvalhoRMeenaN.  [address] [place] [id number]  VIA Facsimile: [phone]                  jon all pertinent systems normal

## 2023-09-25 ENCOUNTER — HOSPITAL ENCOUNTER (OUTPATIENT)
Facility: MEDICAL CENTER | Age: 48
End: 2023-09-25
Payer: MEDICAID

## 2023-09-25 PROCEDURE — 87338 HPYLORI STOOL AG IA: CPT

## 2023-09-25 PROCEDURE — 87493 C DIFF AMPLIFIED PROBE: CPT

## 2023-09-25 PROCEDURE — 87077 CULTURE AEROBIC IDENTIFY: CPT

## 2023-09-25 PROCEDURE — 83630 LACTOFERRIN FECAL (QUAL): CPT

## 2023-09-25 PROCEDURE — 87899 AGENT NOS ASSAY W/OPTIC: CPT

## 2023-09-25 PROCEDURE — 87046 STOOL CULTR AEROBIC BACT EA: CPT

## 2023-09-25 PROCEDURE — 83993 ASSAY FOR CALPROTECTIN FECAL: CPT

## 2023-09-25 PROCEDURE — 87328 CRYPTOSPORIDIUM AG IA: CPT

## 2023-09-25 PROCEDURE — 87045 FECES CULTURE AEROBIC BACT: CPT

## 2023-09-25 PROCEDURE — 82653 EL-1 FECAL QUANTITATIVE: CPT

## 2023-09-26 LAB
C DIFF DNA SPEC QL NAA+PROBE: NEGATIVE
C DIFF TOX GENS STL QL NAA+PROBE: NEGATIVE
E COLI SXT1+2 STL IA: NORMAL
G LAMBLIA+C PARVUM AG STL QL RAPID: NORMAL
H PYLORI AG STL QL IA: NOT DETECTED
LACTOFERRIN STL QL IA: NEGATIVE
SIGNIFICANT IND 70042: NORMAL
SIGNIFICANT IND 70042: NORMAL
SITE SITE: NORMAL
SITE SITE: NORMAL
SOURCE SOURCE: NORMAL
SOURCE SOURCE: NORMAL

## 2023-09-28 LAB
BACTERIA STL CULT: NORMAL
CALPROTECTIN STL-MCNT: 10 UG/G
E COLI SXT1+2 STL IA: NORMAL
ELASTASE PANC STL-MCNT: >800 UG/G
SIGNIFICANT IND 70042: NORMAL
SITE SITE: NORMAL
SOURCE SOURCE: NORMAL

## 2025-04-21 ENCOUNTER — APPOINTMENT (OUTPATIENT)
Dept: RADIOLOGY | Facility: MEDICAL CENTER | Age: 50
End: 2025-04-21
Attending: PSYCHIATRY & NEUROLOGY
Payer: MEDICAID

## 2025-06-16 ENCOUNTER — PATIENT OUTREACH (OUTPATIENT)
Dept: ONCOLOGY | Facility: MEDICAL CENTER | Age: 50
End: 2025-06-16
Payer: MEDICAID

## 2025-06-16 NOTE — PROGRESS NOTES
First call placed to Saira to introduce self and assess. No answer at time of call, LVM with reason for call and call back number. NP appt with Dr. Ferguson on 7/2 to establish care for MM, remission status unspecified.

## 2025-07-02 ENCOUNTER — HOSPITAL ENCOUNTER (OUTPATIENT)
Dept: HEMATOLOGY ONCOLOGY | Facility: MEDICAL CENTER | Age: 50
End: 2025-07-02
Attending: STUDENT IN AN ORGANIZED HEALTH CARE EDUCATION/TRAINING PROGRAM
Payer: MEDICAID

## 2025-07-02 VITALS
HEIGHT: 64 IN | TEMPERATURE: 97.8 F | OXYGEN SATURATION: 97 % | WEIGHT: 161 LBS | DIASTOLIC BLOOD PRESSURE: 78 MMHG | SYSTOLIC BLOOD PRESSURE: 128 MMHG | BODY MASS INDEX: 27.49 KG/M2 | HEART RATE: 82 BPM

## 2025-07-02 DIAGNOSIS — C90.00 MULTIPLE MYELOMA NOT HAVING ACHIEVED REMISSION (HCC): Primary | ICD-10-CM

## 2025-07-02 PROCEDURE — 99214 OFFICE O/P EST MOD 30 MIN: CPT | Performed by: STUDENT IN AN ORGANIZED HEALTH CARE EDUCATION/TRAINING PROGRAM

## 2025-07-02 PROCEDURE — 99212 OFFICE O/P EST SF 10 MIN: CPT | Performed by: STUDENT IN AN ORGANIZED HEALTH CARE EDUCATION/TRAINING PROGRAM

## 2025-07-04 PROBLEM — C90.00 MULTIPLE MYELOMA NOT HAVING ACHIEVED REMISSION (HCC): Status: ACTIVE | Noted: 2025-07-04

## 2025-07-04 ASSESSMENT — ENCOUNTER SYMPTOMS
DIAPHORESIS: 0
SINUS PAIN: 0
BLURRED VISION: 0
ORTHOPNEA: 0
HEARTBURN: 0
WEAKNESS: 0
WHEEZING: 0
ABDOMINAL PAIN: 0
PALPITATIONS: 0
NERVOUS/ANXIOUS: 0
VOMITING: 0
NAUSEA: 0
SORE THROAT: 0
SHORTNESS OF BREATH: 0
BLOOD IN STOOL: 0
CHILLS: 0
HEADACHES: 0
INSOMNIA: 0
DOUBLE VISION: 0
FEVER: 0
CONSTIPATION: 0
BACK PAIN: 0
BRUISES/BLEEDS EASILY: 0
MYALGIAS: 0
DIZZINESS: 0
DIARRHEA: 0
TINGLING: 0
COUGH: 0
WEIGHT LOSS: 0
SPUTUM PRODUCTION: 0
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
## 2025-07-23 NOTE — ADDENDUM NOTE
Encounter addended by: Deirdre Holder on: 7/23/2025 3:42 PM   Actions taken: Charge Capture section accepted